# Patient Record
Sex: FEMALE | Race: OTHER | HISPANIC OR LATINO | URBAN - METROPOLITAN AREA
[De-identification: names, ages, dates, MRNs, and addresses within clinical notes are randomized per-mention and may not be internally consistent; named-entity substitution may affect disease eponyms.]

---

## 2022-08-17 ENCOUNTER — EMERGENCY (EMERGENCY)
Facility: HOSPITAL | Age: 22
LOS: 1 days | Discharge: HOME | End: 2022-08-17
Attending: EMERGENCY MEDICINE | Admitting: EMERGENCY MEDICINE

## 2022-08-17 VITALS
HEART RATE: 77 BPM | SYSTOLIC BLOOD PRESSURE: 116 MMHG | TEMPERATURE: 97 F | OXYGEN SATURATION: 100 % | DIASTOLIC BLOOD PRESSURE: 71 MMHG | RESPIRATION RATE: 18 BRPM

## 2022-08-17 PROCEDURE — 99284 EMERGENCY DEPT VISIT MOD MDM: CPT

## 2022-08-17 PROCEDURE — 90792 PSYCH DIAG EVAL W/MED SRVCS: CPT
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
## 2022-08-17 NOTE — ED ADULT NURSE NOTE - OBJECTIVE STATEMENT
Pt received to . Pt presents calm and cooperative, pts states hx of depression and admits to texting her mother earlier stating "she wishes she was dead"; pts mother then proceeded to call pts therapist and was advised to come to the ER for further evaluation. Pt expressing passive SI in ER stating "she has just gone thru so much in her life"; denies HI. Pts belongings secured for safety. Pt awaiting psychiatric consultation.

## 2022-08-17 NOTE — ED PROVIDER NOTE - PATIENT PORTAL LINK FT
You can access the FollowMyHealth Patient Portal offered by [facility] by registering at the following website: [email or web address]. By joining [email or web address]’s FollowMyHealth portal, you will also be able to view your health information using other applications (apps) compatible with our system.

## 2022-08-17 NOTE — ED PROVIDER NOTE - NSFOLLOWUPINSTRUCTIONS_ED_ALL_ED_FT
PLEASE FOLLOW UP WITH YOUR OUT PATIENT PSYCHIATRIC TEAM, TAKE YOUR MEDICATION AS IT PRESCRIBED BY THEM.     FOLLOW-UP WITH YOUR PRIMARY CARE DOCTOR IN 1-2 DAYS TO DISCUSS YOUR ER APPOINTMENT.   YOU MAY FOLLOW-UP WITH THE [facility] AS NEEDED FOR .  IF NEEDED, CALL PATIENT ACCESS SERVICES AT [phone] TO FIND A PRIMARY CARE PHYSICIAN.  OR CALL [phone] TO MAKE AN APPOINTMENT WITH THE CLINIC.  RETURN TO THE ER FOR ANY WORSENING SYMPTOMS OR CONCERNS.
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
## 2022-08-17 NOTE — ED BEHAVIORAL HEALTH ASSESSMENT NOTE - RISK ASSESSMENT
patient with no HX of SA, no psychotic, manic symptoms, mild depressive symptoms, no active or passive SI, no intent or plan, no substance abuse, She does not want to be admitted, mother has no safety concern.  patient has an appointment with case manage will be discharged Low Acute Suicide Risk

## 2022-08-17 NOTE — ED ADULT NURSE NOTE - NSFALLRSKUNASSIST_ED_ALL_ED
1999 onset, relapsing  New findings in 4/2018 - preferred not to do follow up MRIs or change therapies  - reviewed treatment goals and irreversibility of disability  - continue Sandra and in-person exam when able  - defer MRIs as above  - check vit D  - CBC/CMP   no

## 2022-08-17 NOTE — ED BEHAVIORAL HEALTH ASSESSMENT NOTE - SUMMARY
22 yo employed, domiciled female, non-caregiver with no PMHx , but PPHx of BPD, PTSD and depression, no substance abuse Hx; no Hx of violence, no psych admissions, Hx of SIB via cutting, no Hx of SA. was brought to ER for psych eval, referred by  who wanted patient to have med adjustment in ER s/p patient text messaged her mother "nothing makes me happy, I don't feel like myself"   patient denies any active or passive SI, she denies feeling anxious, no insomnia, no psychosis, She wants to go home because she has to work tomorrow, No HX of SA., future oriented.  Mother has no safety concerns, patient has an appointment with a psychiatrist in the beginning-middle of September; on zoloft, has enough meds till next appointment, patient does not want to be admitted, mother has no safety concerns

## 2022-08-17 NOTE — ED BEHAVIORAL HEALTH ASSESSMENT NOTE - REFERRAL / APPOINTMENT DETAILS
pt has an an appointment with  tomorrow and therapist in Tuesday (in 5 days) and has already has an appointment scheduled in the beginning of September with a psychiatrist

## 2022-08-17 NOTE — ED BEHAVIORAL HEALTH ASSESSMENT NOTE - HPI (INCLUDE ILLNESS QUALITY, SEVERITY, DURATION, TIMING, CONTEXT, MODIFYING FACTORS, ASSOCIATED SIGNS AND SYMPTOMS)
The patient is 22 yo employed (as a  at [employer] ), single female, non-caregiver, who resides with her mother, grandmother and younger brother and has no significant PMH. She has PPHx of depression and BPD; SIB and "SA?" as per the patient (2 weeks ago she cut her wrist superficially and was in [facility] for observation for 2 days). Today she was brought to ER by her mother after patient's  told her mother to do so. Patient reports that she was upset yesterday and text messaged her mother "something like I don't know how I got here ; nothing makes me happy" and she (the mother) sent the text message to her  today, and "Alexa (the ) told us to come to ER for evaluation".   During evaluation, patient is calm and cooperative, she states that she did not mean that she wants to die when she texted her mother, she did not feel that she does not want to live anymore; she states that she was stressed out at work". Patient states that she went through a lot in the past and sometimes she gets stressed out, "but I have very good support system, my mom is doing her best and I have supportive boyfriend" She states that she feels depressed "sometimes", but she is able to function well, she works 5 days a week; takes care of herself, dates her BF and thinking about going to college. She states that she is sleeping "OK ; 6-7 hours and has fair energy level, she is able to concentrate . She adds that sometimes she feels helpless, denies feeling hopelessness. She states that sometimes she feels like life is not worth living, but denies any active SI, no intent ot plan. She denies any active or passive SI at present time, She denies any psychotic symptoms, no voices, visions or delusions , no paranoia, no IOR , no thought insertion or broadcasting, no grandiosity, She denies any manic symptoms at present time and in the past. No irritability, mood swings, no spending spree , diminished need for sleep or goal directed activity. She states that she wants to go home. Denies any active or passive suicidal ideations, no intent or plan, denies feeling anxious, no insomnia, no hopelessness, no psychosis. She states that her case manage is "trying to refer me to a psychiatrist for support", She is asking to be discharged.  Collateral information (see  note)

## 2022-08-17 NOTE — ED BEHAVIORAL HEALTH ASSESSMENT NOTE - DESCRIPTION
Vital Signs Last 24 Hrs  T(C): 36.3 (17 Aug 2022 22:02), Max: 36.3 (17 Aug 2022 22:02)  T(F): 97.4 (17 Aug 2022 22:02), Max: 97.4 (17 Aug 2022 22:02)  HR: 77 (17 Aug 2022 22:02) (77 - 77)  BP: 116/71 (17 Aug 2022 22:02) (116/71 - 116/71)  BP(mean): --  RR: 18 (17 Aug 2022 22:02) (18 - 18)  SpO2: 100% (17 Aug 2022 22:02) (100% - 100%)    Parameters below as of 17 Aug 2022 22:02  Patient On (Oxygen Delivery Method): room air denies see HPI

## 2022-08-17 NOTE — ED ADULT TRIAGE NOTE - CHIEF COMPLAINT QUOTE
Pt with PMH of depression comes in c/o that her medications are not helping her and she feels depressed and has suicidal thoughts. No suicidal plans or homicidal ideation. calm and cooperative during triage.
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
## 2022-08-17 NOTE — ED BEHAVIORAL HEALTH ASSESSMENT NOTE - NSSUICPROTFACT_PSY_ALL_CORE
in relationship "I love my boyfriend!"/Responsibility to children, family, or others/Identifies reasons for living/Supportive social network of family or friends/Engaged in work or school/Positive therapeutic relationships
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
## 2022-08-17 NOTE — ED BEHAVIORAL HEALTH ASSESSMENT NOTE - ADDITIONAL DETAILS ALL
patient states that she cut her wrist x 2 times, stated that it was not a SA at first,m but then says that she does not know. She cut her wrist last time 2 weeks ago, did not need stichesl was in [facility] for 2 days for observation.

## 2022-08-18 DIAGNOSIS — F33.41 MAJOR DEPRESSIVE DISORDER, RECURRENT, IN PARTIAL REMISSION: ICD-10-CM

## 2022-08-18 NOTE — ED BEHAVIORAL HEALTH NOTE - BEHAVIORAL HEALTH NOTE
As per request of provider , writer contacted patient’s  mother luis enrique ([phone]) for collateral information. Luis Enrique asked to meet in person. Writer met with mother in ED. The following information is per the mother.  Patient is a 20 YO female domiciled w/ mother, mother’s bf and 16 YO brother. Patient bib mother recommended by patient’s  Alexa adams ([phone]) after a depressive paragraph long text sent to the mother on 8/17. In the text patient reports feeling hopeless, saying nothing makes her happy, she doesn’t feel like herself and how did she get there? Mother reports patient did not endorse any si/hi/AVH. Patient has a hx of self harm and last cut superficially 2 weeks ago which she was observed overnight at [facility]. Patient has a hx of MDD. Patient currently being linked to new psychiatrist and has an appointment in September. Patient has a private therapist Ganesh ([phone]) which she has an apt with later this week. Mother reports patient did not go to work due today due to feeling depressed. Patient presented better later in the day. Patient is prescribed Zoloft 200mg daily. Patient’s diet is poor and sleep is erratic. Mother has no safety concerns for the patient and is comfortable taking the patient home. Writer informed mother patient is cleared for discharge.

## 2022-08-18 NOTE — BH SAFETY PLAN - CRISIS CLINICIAN NAME 2
We gave you the pain medication called hydrocodone today at the clinic.    If you need to you can still do a dose of ibuprofen or Aleve tonight as well    Use ice pack over the forehead and heating pack over the back and neck    Continue to take your Zyrtec and Flonase    Make sure you drink plenty of fluids (non-caffeinated)    Go home and tried to go to bed early tonight    If headache worsens significantly then follow-up in the emergency department.  If headache persistent but not worse than make follow-up appointment with your primary care doctor  
Liu (SW)

## 2023-03-17 NOTE — ED BEHAVIORAL HEALTH ASSESSMENT NOTE - NS ED [facility] ED COURSE FOUR POINT RESTRAINTS IN ED YN
Health Maintenance Due   Topic Date Due   • COVID-19 Vaccine (1) Never done       Patient is due for topics as listed above but is not proceeding with Immunization(s) COVID-19 at this time.    No

## 2024-04-18 NOTE — ED PROVIDER NOTE - DISPOSITION TYPE
----- Message from Lizeth Treadwell LPN sent at 4/18/2024  1:46 PM CDT -----  Contact: MARNIE SHAH [[id number]]    ----- Message -----  From: Michelle Cueva  Sent: 4/18/2024   1:00 PM CDT  To: [facility] Staff    ..Type:  Patient Requesting Call    Who Called: MARNIE SHAH [[id number]]  Does the patient know what this is regarding?: fluticasone-salmeterol diskus inhaler 250-50 mcg issues, bad reaction and can't take   Would the patient rather a call back or a response via [facility]?  call  Best Call Back Number: .[phone] (home)   Additional Information:     Also refill evalbuterol (XOPENEX) 1.25 mg/3 mL nebulizer solution at    [facility] [address] [id number] [address] [address]  Phone: [phone] Fax: [phone]  
DISCHARGE

## 2024-08-02 ENCOUNTER — OUTPATIENT (OUTPATIENT)
Dept: OUTPATIENT SERVICES | Facility: HOSPITAL | Age: 24
LOS: 1 days | Discharge: ANOTHER HOSPITAL | End: 2024-08-02
Payer: COMMERCIAL

## 2024-08-02 PROCEDURE — 99214 OFFICE O/P EST MOD 30 MIN: CPT

## 2024-08-06 DIAGNOSIS — F39 UNSPECIFIED MOOD [AFFECTIVE] DISORDER: ICD-10-CM

## 2024-08-19 ENCOUNTER — EMERGENCY (EMERGENCY)
Facility: HOSPITAL | Age: 24
LOS: 1 days | Discharge: HOME | End: 2024-08-19
Attending: EMERGENCY MEDICINE | Admitting: EMERGENCY MEDICINE
Payer: COMMERCIAL

## 2024-08-19 VITALS
DIASTOLIC BLOOD PRESSURE: 83 MMHG | WEIGHT: 166.89 LBS | OXYGEN SATURATION: 95 % | HEART RATE: 74 BPM | RESPIRATION RATE: 18 BRPM | SYSTOLIC BLOOD PRESSURE: 119 MMHG | TEMPERATURE: 98 F

## 2024-08-19 DIAGNOSIS — F32.A DEPRESSION, UNSPECIFIED: ICD-10-CM

## 2024-08-19 DIAGNOSIS — F60.3 BORDERLINE PERSONALITY DISORDER: ICD-10-CM

## 2024-08-19 PROCEDURE — 90792 PSYCH DIAG EVAL W/MED SRVCS: CPT | Mod: GC

## 2024-08-19 PROCEDURE — 99285 EMERGENCY DEPT VISIT HI MDM: CPT

## 2024-08-19 NOTE — ED PROVIDER NOTE - PROGRESS NOTE DETAILS
DANIELLA Davila- no blood work was drawn , psych recommendation out patient follow up. There is no clinical evidence of intoxication, or any acute medical problem requiring immediate intervention. There are no signs of emergency conditions requiring admission to the hospital. At present time patient not a harm to self or others and can be safely discharge to follow up with psychiatrist.

## 2024-08-19 NOTE — ED PROVIDER NOTE - NSFOLLOWUPINSTRUCTIONS_ED_ALL_ED_FT
Follow up with your primary care physician and psychiatrist in 48-72 hours.  You may also see the psychiatrist at [facility]:    [address] [place] [address]  Phone: [phone]    [facility] on [facility] Information:    -Walk-in hours: Monday to Friday, 9am to 3pm   -Almost all walk-in patients will be able to see a psych prescriber the same day   -Scheduled, non-urgent, evening remote/virtual appointments are available on a limited basis. Call our  to inquire about these: [phone]. A crisis center clinician screens these requests in the late afternoon and if appropriate it takes a few days to set-up.   -Visits take about 2 to 4 hours total   -Mornings are the best time for patients to arrive    For Telehealth options try:  [employer]ment: [email or web address] (to access psychiatrist or therapist)  [employer]: [employer] (to access psychiatrist or therapist)  Better Help: [email or web address].com (Largest online therapy group)      SEEK IMMEDIATE MEDICAL CARE IF YOU HAVE ANY OF THE FOLLOWING SYMPTOMS: thoughts about hurting or killing yourself, thoughts about hurting or killing somebody else, hallucinations or any other worsening or persistent symptoms OR ANY NEW OR CONCERNING SYMPTOMS.

## 2024-08-19 NOTE — ED BEHAVIORAL HEALTH ASSESSMENT NOTE - HPI (INCLUDE ILLNESS QUALITY, SEVERITY, DURATION, TIMING, CONTEXT, MODIFYING FACTORS, ASSOCIATED SIGNS AND SYMPTOMS)
This is a 23 year old, patient (preferred pronouns: he/they), domiciled at home with mom and sister, employed as , non-caregiver, PPHx of depression, anxiety, bipolar? BPD?, 1 prior hospitalization in 2021 for 3 days for SI, no hx of SA, hx of NSSIB (started at age 13), presents to the ED for SI with plans to jump in front of train.     Pt euthymic on interview though intermittently tearful; often circumstantial and overinclusive of details. States that she is here because she is depressed and has had thoughts of wanting to die. Endorses loose plans of cutting herself with a kitchen knife or jumping in front of a train in front of a work. Denies intent and plan. Says that she feels unsettled because she has had existential thoughts on life and death which makes her less fearful of the thoughts of death. Says that for the past several months, she has had periods of dissociation (once every 2 days, especially before falling asleep) where she feels things are not real. Reports 1x NSSIB episode several weeks ago (healed 2 cm horizontal, superficial laceration noted on L foream). Feels guilty because she has not cut since 2022 (when she was last admitted to [facility] for SI).  Recalls multiple stressors in past several weeks including: recently questioning gender identity (he/they), recent passing of her dog; sounds of the thunder last night bringing back traumatic memories and increased stress at work. 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 Endorses sx of borderline personality disorder including  hx of of feeling abandoned (references death of her father and aunt); unstable sense of self; chronic feels of empiness; impulsiveness (hypersexual in teenage years, occasional reckless spending online); and intermittent dissociation. Denies decreased need for sleep with increased energy lasting for longer than a week. Currently endorses feeling depressed though no changes in sleep and appetite. Endorses passive SI though with loose intent and plan. No AH/VH. No HI. No substance use.     On lamictal 25 mg BID (started on  psych admission in 2022, prescribed by PCP).   Currently seeing a new therapist online. Hx of multiple therapists; longest period with therapist via [facility] for 10 years. No hx of DBT. No outpatient psychiatrist (recently seen at [facility] with community referrals in [facility]  though pt never followed up).     Per chart review (Jossy):  pt presented to [facility] on 08/02/2024 seeking eval for SI and for medication evaluation. Pt presenting with passive SI (intrusive thoughts of jumping in front of train tracks), and 4 weeks of "dissociated" mood and derealization (feeling as if she did not exist). At that time pt with increased stress at work and managing with recent death of dog 3 weeks ago. Pt continued on lamictal 25 mg BID (been on it for 2 years, managed by PCP). Pt connected with long term care through [facility].     Per collateral from mother:  reiterates hx of depression since father passed away in 2012. Has been in therapy since 2012. Brought pt to ED this morning bc pt had told mom that they had thoughts of SI w/ loose plans to jump in front of train tracks.. Mom knows of chronic SI but concerned because pt never voiced actual plan before. Reiterates recent stressors including loss of dog 3 weeks ago. Mom reports concerns of attachment dynamics that are not appropriate for her age. This is a 23 year old, patient (preferred pronouns: he/they), domiciled at home with mom and sister, employed as , non-caregiver, PPHx of depression, anxiety, bipolar?, 1 prior hospitalization in 2021 for 3 days for SI, no hx of SA, hx of NSSIB (started at age 13), presents to the ED for SI with plans to jump in front of train.     Pt euthymic on interview though intermittently tearful; often circumstantial and overinclusive of details. States that they are here because they are depressed and has had thoughts of wanting to die. Endorses loose plans of cutting themselves with a kitchen knife or jumping in front of a train in front of a work. Denies intent and plan. Says that they feel unsettled because they have had existential thoughts on life and death which makes them less fearful of the thoughts of death. Says that for the past several months, they hav had periods of dissociation (once every 2 days, especially before falling asleep) where they feel things are not real. Reports 1x NSSIB episode several weeks ago (healed 2 cm horizontal, superficial laceration noted on L forearm). Feels guilty because they have not cut since 2022 (when they were last admitted to [facility] for [facility]).  Recalls multiple stressors in past several weeks including: recently questioning gender identity (he/they), recent passing of their dog; sounds of the thunder last night bringing back traumatic memories and increased stress at work. 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 Endorses sx of borderline personality disorder including  hx of of feeling abandoned (references death of their father and aunt); unstable sense of self; chronic feels of emptiness; impulsiveness (hypersexual in teenage years, occasional reckless spending online); and intermittent dissociation. Denies decreased need for sleep with increased energy lasting for longer than a week. Currently endorses feeling depressed though no changes in sleep and appetite. Endorses passive SI though with loose intent and plan. No AH/VH. No HI. No substance use.     On lamictal 25 mg BID (started on IP psych admission in 2022, prescribed by PCP).   Currently seeing a new therapist online. Hx of multiple therapists; longest period with therapist via [facility] for 10 years. No hx of DBT. No outpatient psychiatrist (recently seen at [facility] with community referrals in [facility]  though pt never followed up).     Per chart review (Jossy):  pt presented to [facility] on 08/02/2024 seeking eval for SI and for medication evaluation. Pt presenting with passive SI (intrusive thoughts of jumping in front of train tracks), and 4 weeks of "dissociated" mood and derealization (feeling as if they did not exist). At that time pt with increased stress at work and managing with recent death of dog 3 weeks ago. Pt continued on lamictal 25 mg BID (been on it for 2 years, managed by PCP). Pt connected with long term care through [facility].     Per collateral from mother:  reiterates hx of depression since father passed away in 2012. Has been in therapy since 2012. Brought pt to ED this morning bc pt had told mom that they had thoughts of SI w/ loose plans to jump in front of train tracks. Mom knows of chronic SI but concerned that pt never voiced actual plan before. Reiterates recent stressors including loss of dog 3 weeks ago. Mom reports concerns of attachment dynamics that are not appropriate for their age. This is a 23 year old, patient (preferred pronouns: he/they), domiciled at home with mom and sister, employed as , non-caregiver, PPHx of depression, anxiety, bipolar?, 1 prior hospitalization in 2021 for 3 days for SI, no hx of SA, hx of NSSIB (started at age 13), presents to the ED for suicidal ideation.     Pt euthymic on interview and cooperative. States that they are here because they are depressed and has had thoughts of wanting to die in the past. Endorses experiencing loose plans of cutting self with a kitchen knife or jumping in front of a train though denies intent and denies preparations for suicide. Denies current suicidal ideation, intent, and plan. Says that they feel unsettled because they have had existential thoughts on life and death which makes them less fearful of the thoughts of death. Says that for the past several months, they have had periods of dissociation (once every 2 days, especially before falling asleep) where they feel things are not real. Reports 1x NSSIB episode several weeks ago (healed 2 cm horizontal, superficial laceration noted on L forearm). Feels guilty because they have not cut since 2022 (when they were last admitted to [facility] for SI).  Recalls multiple stressors in past several weeks including: recently questioning gender identity (he/they), recent passing of their dog; sounds of the thunder last night bringing back traumatic memories and increased stress at work. 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 Endorses sx of borderline personality disorder including  hx of of feeling abandoned (references death of their father and aunt); unstable sense of self; chronic feels of emptiness; impulsiveness (hypersexual in teenage years, occasional reckless spending online); and intermittent dissociation. Denies decreased need for sleep with increased energy lasting for longer than a week. Currently endorses feeling depressed though no changes in sleep and appetite. No AH/VH. No HI. No substance use. Feels safe to go home and engaged in safety planning.     On lamictal 25 mg BID (started on IP psych admission in 2022, prescribed by PCP).  Currently seeing a new therapist online. Hx of multiple therapists; longest period with therapist via [facility] for 10 years. No hx of DBT. No outpatient psychiatrist (recently seen at [facility] with community referrals in [facility]  though pt never followed up).     Per chart review (Jossy):  pt presented to [facility] on 08/02/2024 seeking eval for SI and for medication evaluation. Pt presenting with passive SI (intrusive thoughts of jumping in front of train tracks), and 4 weeks of "dissociated" mood and derealization (feeling as if they did not exist). At that time pt with increased stress at work and managing with recent death of dog 3 weeks ago. Pt continued on lamictal 25 mg BID (been on it for 2 years, managed by PCP). Pt connected with long term care through [facility].     Per collateral from mother:  reiterates hx of depression since father passed away in 2012. Has been in therapy since 2012. Brought pt to ED this morning bc pt had told mom that they had thoughts of SI w/ loose plans to jump in front of train tracks. Mom knows of chronic SI but concerned that pt never voiced actual plan before. Reiterates recent stressors including loss of dog 3 weeks ago. Mom reports concerns of attachment dynamics that are not appropriate for their age. No acute safety concerns and mom is okay with patient returning home.

## 2024-08-19 NOTE — ED BEHAVIORAL HEALTH ASSESSMENT NOTE - DETAILS
see HPI see chart note verbal discussion with mother self reports of mild serotonin syndrome father passed away in 2012, bullied in middle school

## 2024-08-19 NOTE — ED BEHAVIORAL HEALTH ASSESSMENT NOTE - NSBHATTESTCOMMENTATTENDFT_PSY_A_CORE
I evaluated the patient, reviewed collateral information, the patient's charge, and discussed case with the resident. In short, the patient is 23 years old, domiciled at home with mom and sister, employed as , non-caregiver, PPHx of depression, anxiety, bipolar?, 1 prior hospitalization in 2021 for 3 days for SI, no hx of SA, hx of NSSIB (started at age 13), presents to the ED for suicidal ideation. On my evaluation patient was calm and cooperative; states they feel better since they've been in the hospital. Did not endorse SI/HI, denies suicidal intent, plan, feels to return home. Mom has no acute safety concerns. Pt has chronic NSSIB and passive suicidal ideation though she is not actively suicidal and she has been fulfilling her ADLS, including going to work. Pt reports that they are interested in outpatient therapy and med management as they are only on Lamictal and they are not in therapy. Therapy such as DBT to address underlying low frustration tolerance poor coping skills will be useful. Pt agreeable to  referral to [facility] and will f/u at crisis clinic as bridge if needed. Pt is at low acute risk of harm to self or others at this time and she does not meet criteria for involuntary hospitalization.

## 2024-08-19 NOTE — ED PROVIDER NOTE - CLINICAL SUMMARY MEDICAL DECISION MAKING FREE TEXT BOX
This is a 23-year-old female no past medical history, past psychiatric history of bipolar disorder with complaining of suicidal ideation and increased anxiety. Patient is non- caregiver, single lives with her mother and currently working. Endorses multiple stressors and increase suicidality with a plan to get hit by a train. Reports self-injurious behaviors approximately a month ago via cutting her forearms. Endorses previous psychiatric hospitalization with similar presentations. Currently sees a therapist but not a psychiatrist. Her lamotrigine prescribed by her PCP. Denies homicidal ideations, auditory, visual or tactile hallucinations, denies physical complaint at this time.  psych consult  psychiatric clearance labs, Depakote level

## 2024-08-19 NOTE — ED BEHAVIORAL HEALTH ASSESSMENT NOTE - OTHER
warning signs: increasing existential thoughts, desires to self harm, thoughts of jumping in front of train tracks; internal coping strategies:

## 2024-08-19 NOTE — ED BEHAVIORAL HEALTH ASSESSMENT NOTE - NSBHATTESTTYPEVISIT_PSY_A_CORE
"[facility] Surg Telemetry  Adult Nutrition  Progress Note    SUMMARY     Recommendations    1.  Continue Regular diet as ordered    2.  Monitor Weights Weekly    Assessment and Plan    Pt reports good appetite, no difficulty eating; wts stable    Reason for Assessment    Reason For Assessment: length of stay  Diagnosis:  (Non-Traumatic Rhabdo, Left arm swellling, Schizophrenia, Transaminitis)  Relevant Medical History: Schizophrenia    Nutrition Risk Screen    Nutrition Risk Screen: no indicators present    Nutrition/Diet History    Patient Reported Diet/Restrictions/Preferences: general  Typical Food/Fluid Intake: reports good oral intake PTA  Food Allergies:  (Pineapple)  Factors Affecting Nutritional Intake: None identified at this time    Anthropometrics    Temp: 97.7 °F (36.5 °C)  Height Method: Stated  Height: 5' 10.98" (180.3 cm)  Height (inches): 70.98 in  Weight Method: Bed Scale  Weight: 69.7 kg (153 lb 10.6 oz)  Weight (lb): 153.66 lb  Ideal Body Weight (IBW), Male: 171.88 lb  % Ideal Body Weight, Male (lb): 89.4 %  BMI (Calculated): 21.4  BMI Grade: 18.5-24.9 - normal  Usual Body Weight (UBW), k.18 kg  % Usual Body Weight: 102.44  % Weight Change From Usual Weight: 2.23 %     Wt Readings from Last 2 Encounters:   22 69.7 kg (153 lb 10.6 oz)   22 68 kg (150 lb)       Lab/Procedures/Meds    Pertinent Labs Comments: 22 -- K 3.6, BUN 6, Cr 0.78, CK 17,913 H  Pertinent Medications Comments: LR @ 200 ml/hr      Nutrition Prescription Ordered    Current Diet Order: Regular    Evaluation of Received Nutrient/Fluid Intake    Tolerance: tolerating  % Intake of Estimated Energy Needs: 75 - 100 %  % Meal Intake: 75 - 100 %    Nutrition Risk    Level of Risk/Frequency of Follow-up: low     Monitor and Evaluation    Food and Nutrient Intake: food and beverage intake  Food and Nutrient Adminstration: diet order  Anthropometric Measurements: weight change     Nutrition " Nursing report ED to floor  Jaja Carcamo  78 y.o.  female    HPI:   Chief Complaint   Patient presents with    Abdominal Pain    Shortness of Breath       Admitting doctor:   Juanis Lowe MD    Admitting diagnosis:   The primary encounter diagnosis was Urinary tract infection without hematuria, site unspecified. Diagnoses of Sepsis, due to unspecified organism, unspecified whether acute organ dysfunction present and Renal insufficiency were also pertinent to this visit.    Code status:   Current Code Status       Date Active Code Status Order ID Comments User Context       6/5/2024 1607 CPR (Attempt to Resuscitate) [id number]  Mariangel Stearns APRN ED        Question Answer    Code Status (Patient has no pulse and is not breathing) CPR (Attempt to Resuscitate)    Medical Interventions (Patient has pulse or is breathing) Full Support                    Allergies:   Zolpidem    Isolation:  No active isolations     Fall Risk:  Fall Risk Assessment was completed, and patient is at high risk for falls.   Predictive Model Details         20 (Low) Factor Value    Calculated 6/5/2024 17:55 Age 78    Risk of Fall Model Respiratory Rate 44     Active Peripheral IV Present     Imaging order in this encounter Present     Magnesium not on file     Number of Distinct Medication Classes administered 4     Chloride 87 mmol/L     Chapo Scale not on file     Total Bilirubin 1.4 mg/dL     Creatinine 1.45 mg/dL     Diastolic BP 77     Days after Admission 0.251     Tobacco Use Quit     Albumin 4.4 g/dL     ALT 17 U/L     Calcium 10.3 mg/dL     Potassium 3.1 mmol/L         Weight:       06/05/24  1152   Weight: 71.3 kg (157 lb 3 oz)       Intake and Output    Intake/Output Summary (Last 24 hours) at 6/5/2024 1755  Last data filed at 6/5/2024 1542  Gross per 24 hour   Intake 1100 ml   Output --   Net 1100 ml       Diet:   Dietary Orders (From admission, onward)       Start     Ordered    06/05/24 1647  Diet: Renal; Low Sodium  (2-3g), Low Potassium, Low Phosphorus; Fluid Consistency: Thin (IDDSI 0)  Diet Effective Now        References:    Diet Order [facility]   Question Answer Comment   Diets: Renal    Renal Diet: Low Sodium (2-3g)    Renal Diet: Low Potassium    Renal Diet: Low Phosphorus    Fluid Consistency: Thin (IDDSI 0)        06/05/24 1646                     Most recent vitals:   Vitals:    06/05/24 1620 06/05/24 1635 06/05/24 1650 06/05/24 1701   BP:    136/77   BP Location:       Patient Position:       Pulse: 120 118 112 119   Resp:       Temp:       TempSrc:       SpO2: 93% 93% 94% 94%   Weight:       Height:           Active LDAs/IV Access:   Lines, Drains & Airways       Active LDAs       Name Placement date Placement time Site Days    Peripheral IV 06/05/24 1218 Left Antecubital 06/05/24  1218  Antecubital  less than 1                    Skin Condition:   Skin Assessments (last day)       Date/Time Skin WDL    06/05/24 1230 WDL             Labs (abnormal labs have a star):   Labs Reviewed   COMPREHENSIVE METABOLIC PANEL - Abnormal; Notable for the following components:       Result Value    Glucose 320 (*)     BUN 50 (*)     Creatinine 1.45 (*)     Sodium 134 (*)     Potassium 3.1 (*)     Chloride 87 (*)     Total Bilirubin 1.4 (*)     BUN/Creatinine Ratio 34.5 (*)     Anion Gap 18.1 (*)     eGFR 37.2 (*)     All other components within normal limits    Narrative:     GFR Normal >60  Chronic Kidney Disease <60  Kidney Failure <15    The GFR formula is only valid for adults with stable renal function between ages 18 and 70.   URINALYSIS W/ CULTURE IF INDICATED - Abnormal; Notable for the following components:    Appearance, UA Slightly Cloudy (*)     Glucose, UA >=1000 mg/dL (3+) (*)     Ketones, UA Trace (*)     Blood, UA Trace (*)     Protein, UA 30 mg/dL (1+) (*)     Leuk Esterase, UA Moderate (2+) (*)     All other components within normal limits    Narrative:     In absence of clinical symptoms, the presence of pyuria,  Follow-Up    RD Follow-up?: Yes     "bacteria, and/or nitrites on the urinalysis result does not correlate with infection.   PROCALCITONIN - Abnormal; Notable for the following components:    Procalcitonin 2.39 (*)     All other components within normal limits    Narrative:     As a Marker for Sepsis (Non-Neonates):    1. <0.5 ng/mL represents a low risk of severe sepsis and/or septic shock.  2. >2 ng/mL represents a high risk of severe sepsis and/or septic shock.    As a Marker for Lower Respiratory Tract Infections that require antibiotic therapy:    PCT on Admission    Antibiotic Therapy       6-12 Hrs later    >0.5                Strongly Recommended  >0.25 - <0.5        Recommended   0.1 - 0.25          Discouraged              Remeasure/reassess PCT  <0.1                Strongly Discouraged     Remeasure/reassess PCT    As 28 day mortality risk marker: \"Change in Procalcitonin Result\" (>80% or <=80%) if Day 0 (or Day 1) and Day 4 values are available. Refer to http://www.[facility]-pct-calculator.com    Change in PCT <=80%  A decrease of PCT levels below or equal to 80% defines a positive change in PCT test result representing a higher risk for 28-day all-cause mortality of patients diagnosed with severe sepsis for septic shock.    Change in PCT >80%  A decrease of PCT levels of more than 80% defines a negative change in PCT result representing a lower risk for 28-day all-cause mortality of patients diagnosed with severe sepsis or septic shock.      CBC WITH AUTO DIFFERENTIAL - Abnormal; Notable for the following components:    WBC 23.12 (*)     MCHC 30.8 (*)     RDW 16.9 (*)     RDW-SD 58.2 (*)     Neutrophil % 87.5 (*)     Lymphocyte % 2.7 (*)     Eosinophil % 0.2 (*)     Immature Grans % 1.1 (*)     Neutrophils, Absolute 20.25 (*)     Lymphocytes, Absolute 0.62 (*)     Monocytes, Absolute 1.92 (*)     Immature Grans, Absolute 0.25 (*)     All other components within normal limits   URINALYSIS, MICROSCOPIC ONLY - Abnormal; Notable for the following " components:    WBC, UA Too Numerous to Count (*)     Bacteria, UA 3+ (*)     All other components within normal limits   LACTIC ACID, REFLEX - Abnormal; Notable for the following components:    Lactate 2.2 (*)     All other components within normal limits   POC LACTATE - Abnormal; Notable for the following components:    Lactate 3.2 (*)     All other components within normal limits   RESPIRATORY PANEL PCR W/ COVID-19 (SARS-COV-2), NP SWAB IN UTM/VTP, 2 HR TAT - Normal    Narrative:     In the setting of a positive respiratory panel with a viral infection PLUS a negative procalcitonin without other underlying concern for bacterial infection, consider observing off antibiotics or discontinuation of antibiotics and continue supportive care. If the respiratory panel is positive for atypical bacterial infection (Bordetella pertussis, Chlamydophila pneumoniae, or Mycoplasma pneumoniae), consider antibiotic de-escalation to target atypical bacterial infection.   BLOOD CULTURE   BLOOD CULTURE   URINE CULTURE   LACTIC ACID, REFLEX   CBC AND DIFFERENTIAL    Narrative:     The following orders were created for panel order CBC & Differential.  Procedure                               Abnormality         Status                     ---------                               -----------         ------                     CBC Auto Differential[[id number]]        Abnormal            Final result                 Please view results for these tests on the individual orders.   EXTRA TUBES    Narrative:     The following orders were created for panel order Extra Tubes.  Procedure                               Abnormality         Status                     ---------                               -----------         ------                     Gold Top - SST[[id number]]                                   Final result               Light Blue Top[[id number]]                                   Final result                 Please view results for  these tests on the individual orders.   GOLD TOP - [facility]   LIGHT BLUE TOP       LOC: Person, Place, Time, and Situation    Telemetry:  Telemetry    Cardiac Monitoring Ordered: yes    EKG:   ECG 12 Lead Dyspnea   Preliminary Result   HEART RATE= 123  bpm   RR Interval= 487  ms   MN Interval=   ms   P Horizontal Axis=   deg   P Front Axis=   deg   QRSD Interval= 87  ms   QT Interval= 331  ms   QTcB= 474  ms   QRS Axis= -40  deg   T Wave Axis= 108  deg   - ABNORMAL ECG -   Atrial fibrillation   Ventricular premature complex   Left anterior fascicular block   LVH with secondary repolarization abnormality   When compared with ECG of 24-May-2024 11:36:06,   Significant rate increase   Electronically Signed By:    Date and Time of Study: 2024-06-05 12:00:56      Telemetry Scan   Final Result      Telemetry Scan   Final Result      Telemetry Scan   Final Result          Medications Given in the ED:   Medications   sodium chloride 0.9 % flush 10 mL (has no administration in time range)   apixaban (ELIQUIS) tablet 5 mg (has no administration in time range)   diazePAM (VALIUM) tablet 5 mg (has no administration in time range)   dilTIAZem (CARDIZEM) tablet 90 mg (has no administration in time range)   empagliflozin (JARDIANCE) tablet 25 mg (has no administration in time range)   ferrous sulfate EC tablet 324 mg (has no administration in time range)   levothyroxine (SYNTHROID, LEVOTHROID) tablet 50 mcg (has no administration in time range)   loperamide (IMODIUM) capsule 2 mg (has no administration in time range)   metoprolol succinate XL (TOPROL-XL) 24 hr tablet 50 mg (has no administration in time range)   predniSONE (DELTASONE) tablet 5 mg (has no administration in time range)   tacrolimus (PROGRAF) capsule 1 mg (has no administration in time range)   sodium chloride 0.9 % flush 10 mL (has no administration in time range)   sodium chloride 0.9 % flush 10 mL (has no administration in time range)   sodium chloride 0.9 % infusion  40 mL (has no administration in time range)   Potassium Replacement - Follow Nurse / BPA Driven Protocol (has no administration in time range)   Magnesium Standard Dose Replacement - Follow Nurse / BPA Driven Protocol (has no administration in time range)   Phosphorus Replacement - Follow Nurse / BPA Driven Protocol (has no administration in time range)   Calcium Replacement - Follow Nurse / BPA Driven Protocol (has no administration in time range)   acetaminophen (TYLENOL) tablet 650 mg (has no administration in time range)   melatonin tablet 5 mg (has no administration in time range)   ondansetron (ZOFRAN) injection 4 mg (has no administration in time range)   pantoprazole (PROTONIX) injection 40 mg (has no administration in time range)   cefTRIAXone (ROCEPHIN) 2,000 mg in sodium chloride 0.9 % 100 mL MBP (has no administration in time range)   sodium chloride 0.9 % bolus 1,000 mL (0 mL Intravenous Stopped 6/5/24 1304)   ondansetron (ZOFRAN) injection 4 mg (4 mg Intravenous Given 6/5/24 1234)   acetaminophen (TYLENOL) tablet 1,000 mg (1,000 mg Oral Given 6/5/24 1234)   cefTRIAXone (ROCEPHIN) 2,000 mg in sodium chloride 0.9 % 100 mL MBP (0 mg Intravenous Stopped 6/5/24 1542)   ondansetron (ZOFRAN) injection 4 mg (4 mg Intravenous Given 6/5/24 1515)       Imaging results:  XR Chest 1 View    Result Date: 6/5/2024  Impression: 1. Improved aeration in the left lower lobe since 5/31/2024. Mild medial left basilar airspace disease remains, favored to represent atelectasis. Mild residual left basilar pneumonia not excluded 2. Stable small right pleural effusion. Left pleural effusion appears to have resolved. 3. Stable cardiomegaly. No overt features of edema. Electronically Signed: Karie Bustos MD  6/5/2024 12:46 PM EDT  Workstation ID: [id number]    CT Abdomen Pelvis Without Contrast    Result Date: 6/5/2024  Impression: 1.No acute process nor significant change identified. Electronically Signed: Mark Kiran MD   6/5/2024 12:43 PM EDT  Workstation ID: [id number]     Social issues:   Social History     Socioeconomic History    Marital status:    Tobacco Use    Smoking status: Former    Smokeless tobacco: Never   Vaping Use    Vaping status: Former   Substance and Sexual Activity    Alcohol use: Never    Drug use: Never    Sexual activity: Defer       NIH Stroke Scale:  Interval: (not recorded)  1a. Level of Consciousness: (not recorded)  1b. LOC Questions: (not recorded)  1c. LOC Commands: (not recorded)  2. Best Gaze: (not recorded)  3. Visual: (not recorded)  4. Facial Palsy: (not recorded)  5a. Motor Arm, Left: (not recorded)  5b. Motor Arm, Right: (not recorded)  6a. Motor Leg, Left: (not recorded)  6b. Motor Leg, Right: (not recorded)  7. Limb Ataxia: (not recorded)  8. Sensory: (not recorded)  9. Best Language: (not recorded)  10. Dysarthria: (not recorded)  11. Extinction and Inattention (formerly Neglect): (not recorded)    Total (NIH Stroke Scale): (not recorded)     Additional notable assessment information:     Nursing report ED to floor:  [facility] nurse    Marielle Munoz LPN   06/05/24 17:55 EDT    Attending with Resident/Fellow/Student

## 2024-08-19 NOTE — ED BEHAVIORAL HEALTH ASSESSMENT NOTE - RISK ASSESSMENT
acute rfs: suicidal ideation though without intent or plan, recent NSSIB, active mood episode, recent loss of pt's dog and increased stress at work, stress related to gender identity   chronic rfs: hx 1 prior admission, hx of NSSIB, borderline personality   protective rfs: young, healthy, no hx of SA, fear of death, future oriented, engaged in work, help seeking, motivated to f/u OP care acute rfs: suicidal ideation though without intent or plan, recent NSSIB, active mood episode, recent loss of pt's dog and increased stress at work, stress related to gender identity   chronic rfs: hx 1 prior admission, hx of NSSIB, borderline personality   protective rfs: young, healthy, no hx of SA, fear of death, future oriented, engaged in work, help seeking, motivated to f/u OP care, mom has no safety concerns

## 2024-08-19 NOTE — ED BEHAVIORAL HEALTH ASSESSMENT NOTE - DESCRIPTION
working at [place] [employer] at [employer], did not complete college (majored in art studio) anemia T(C): 36.6 (19 Aug 2024 11:33), Max: 36.6 (19 Aug 2024 11:33)  T(F): 97.9 (19 Aug 2024 11:33), Max: 97.9 (19 Aug 2024 11:33)  HR: 74 (19 Aug 2024 11:33) (74 - 74)  BP: 119/83 (19 Aug 2024 11:33) (119/83 - 119/83)  RR: 18 (19 Aug 2024 11:33) (18 - 18)  SpO2: 95% (19 Aug 2024 11:33) (95% - 95%)

## 2024-08-19 NOTE — ED BEHAVIORAL HEALTH ASSESSMENT NOTE - OTHER PAST PSYCHIATRIC HISTORY (INCLUDE DETAILS REGARDING ONSET, COURSE OF ILLNESS, INPATIENT/OUTPATIENT TREATMENT)
hx of MDD, hx of bipolar disorder dx in 2022 during 1 day IP hospitalization at [facility]

## 2024-08-19 NOTE — ED PROVIDER NOTE - PATIENT PORTAL LINK FT
You can access the FollowMyHealth Patient Portal offered by [facility] by registering at the following website: [email or web address]. By joining [employer]’s FollowMyHealth portal, you will also be able to view your health information using other applications (apps) compatible with our system.

## 2024-08-19 NOTE — ED PROVIDER NOTE - OBJECTIVE STATEMENT
This is a 23-year-old female no past medical history, past psychiatric history of bipolar disorder with complaining of suicidal ideation and increased anxiety. Patient is non- caregiver, single lives with her mother and currently working. Endorses multiple stressors and increase suicidality with a plan to get hit by a train. Reports self-injurious behaviors approximately a month ago via cutting her forearms. Endorses previous psychiatric hospitalization with similar presentations. Currently sees a therapist but not a psychiatrist. Her lamotrigine prescribed by her PCP. Denies homicidal ideations, auditory, visual or tactile hallucinations, denies physical complaint at this time.

## 2024-08-19 NOTE — ED BEHAVIORAL HEALTH ASSESSMENT NOTE - SUMMARY
This is a 23 year old, patient (preferred pronouns: he/they), domiciled at home with mom and sister, employed as , non-caregiver, PPHx of depression, anxiety, bipolar?, 1 prior hospitalization in 2021 for 3 days for SI, no hx of SA, hx of NSSIB (started at age 13), presents to the ED for SI with plans to jump in front of train. Patient continues to endorse passive SI though without intent and plan. Assessment remarkable for symptoms of borderline personality disorder including chronic NSSIB and passive SI; feelings of abandonment;  unstable sense of self; chronic feelings of emptiness; impulsiveness (hypersexual in teenage years, occasional reckless spending online); and intermittent dissociation. HPI from pt and collateral not indicative of current nor hx of manic episodes. Of note, MSE remarkable for detail focused cognitive style that is indicative of mild ASD. No safety concerns per mother. Pt able to safety plan. Discharge to home - pt will benefit from establishing care with therapy specializing in DBT.     Plan:  -treat and release  -disposition: pt is highly help seeking with good insight; - referral to [facility] This is a 23 year old, patient (preferred pronouns: he/they), domiciled at home with mom and sister, employed as , non-caregiver, PPHx of depression, anxiety, bipolar?, 1 prior hospitalization in 2021 for 3 days for SI, no hx of SA, hx of NSSIB (started at age 13), presents to the ED for suicidal ideation. On evaluation patient is not acutely manic, psychotic, or depressed and her symptoms are largely chronic and related to underlying personality pathology. Patient continues to endorse some passive SI though without intent and plan, though later they denied suicidal ideation altogether. Assessment remarkable for symptoms of borderline personality disorder including chronic NSSIB and passive SI; feelings of abandonment;  unstable sense of self; chronic feelings of emptiness; impulsiveness (hypersexual in teenage years, occasional reckless spending online); and intermittent dissociation; which is likely contributing to their symptoms. Of note, MSE remarkable for detail focused cognitive style that is indicative of mild ASD. No safety concerns per mother. Pt able to safety plan and does not meet criteria for Inpatient hospitalization at this time. Pt also wants to return home and does not want inpatient admission.  Plan is to discharge to home with mom - pt will benefit from establishing care with therapy specializing in DBT.     Plan:  -treat and release  -disposition: pt is highly help seeking with good insight; - referral to [facility], pt told to follow up with crisis clinic as bridge until appointment with [facility]

## 2024-08-19 NOTE — ED BEHAVIORAL HEALTH ASSESSMENT NOTE - DIFFERENTIAL
borderline personality disorder vs MDD borderline personality disorder vs major depressive disorder, ASD

## 2024-08-19 NOTE — ED BEHAVIORAL HEALTH NOTE - BEHAVIORAL HEALTH NOTE
urgent referral:  Writer met with patient at bedside. She agrees to be linked to [facility]. Provided her mother's number to contact and gives permission. [phone]. Placed on log.

## 2024-08-19 NOTE — ED ADULT TRIAGE NOTE - CHIEF COMPLAINT QUOTE
pt c/o "I have suicidal thoughts to go on the train tracks"  for a few weeks.  pt has been to the crisis center.  Hx:  depression, bipolar, social anxiety

## 2024-08-21 NOTE — ED BEHAVIORAL HEALTH NOTE - BEHAVIORAL HEALTH NOTE
urgent referral:  writer was provided with apt for 9/5 @ 9am. Worker called patient [phone] and provided apt to patient's mother who states that she will give this information to patient. Patient initially provided permission to speak with mother because she works.

## 2024-09-05 ENCOUNTER — OUTPATIENT (OUTPATIENT)
Dept: OUTPATIENT SERVICES | Facility: HOSPITAL | Age: 24
LOS: 1 days | Discharge: HOME | End: 2024-09-05
Payer: COMMERCIAL

## 2024-09-05 PROCEDURE — 90792 PSYCH DIAG EVAL W/MED SRVCS: CPT

## 2024-09-20 PROCEDURE — 99213 OFFICE O/P EST LOW 20 MIN: CPT

## 2024-09-20 PROCEDURE — 90833 PSYTX W PT W E/M 30 MIN: CPT

## 2024-09-26 DIAGNOSIS — F60.3 BORDERLINE PERSONALITY DISORDER: ICD-10-CM

## 2024-09-26 DIAGNOSIS — F43.23 ADJUSTMENT DISORDER WITH MIXED ANXIETY AND DEPRESSED MOOD: ICD-10-CM

## 2024-11-06 PROCEDURE — 90833 PSYTX W PT W E/M 30 MIN: CPT

## 2024-11-06 PROCEDURE — 99214 OFFICE O/P EST MOD 30 MIN: CPT

## 2024-12-10 PROCEDURE — 90833 PSYTX W PT W E/M 30 MIN: CPT

## 2024-12-10 PROCEDURE — 99214 OFFICE O/P EST MOD 30 MIN: CPT

## 2025-05-06 PROCEDURE — 99213 OFFICE O/P EST LOW 20 MIN: CPT

## 2025-05-06 PROCEDURE — 90833 PSYTX W PT W E/M 30 MIN: CPT
